# Patient Record
(demographics unavailable — no encounter records)

---

## 2024-11-18 NOTE — HISTORY OF PRESENT ILLNESS
[de-identified] : 11/18/24: 63 year old male presents for f/u ears Presents w daughter States b/l worsening hearing loss for past sev years States his hearing is very poor and can only hear when spoken loudly to. Also co of b/l tinnitus hearing aids not working well   5/10/24:  Co hearing loss rt ear very poor question of as hearing loss hx ear infections as child question of difficulty w throat no specific dysphagia